# Patient Record
Sex: MALE | Race: WHITE | Employment: OTHER | URBAN - METROPOLITAN AREA
[De-identification: names, ages, dates, MRNs, and addresses within clinical notes are randomized per-mention and may not be internally consistent; named-entity substitution may affect disease eponyms.]

---

## 2020-08-17 DIAGNOSIS — F41.9 ANXIETY: Primary | ICD-10-CM

## 2020-08-17 RX ORDER — ALPRAZOLAM 1 MG/1
TABLET ORAL
Qty: 180 TAB | Refills: 0 | Status: SHIPPED | OUTPATIENT
Start: 2020-08-17 | End: 2020-08-19 | Stop reason: SDUPTHER

## 2020-08-19 DIAGNOSIS — F41.9 ANXIETY: ICD-10-CM

## 2020-08-20 RX ORDER — ALPRAZOLAM 1 MG/1
1 TABLET ORAL 2 TIMES DAILY
Qty: 180 TAB | Refills: 0 | Status: SHIPPED | OUTPATIENT
Start: 2020-08-20 | End: 2020-11-15

## 2020-09-24 ENCOUNTER — VIRTUAL VISIT (OUTPATIENT)
Dept: INTERNAL MEDICINE CLINIC | Age: 50
End: 2020-09-24

## 2020-09-24 DIAGNOSIS — K64.9 HEMORRHOIDS, UNSPECIFIED HEMORRHOID TYPE: Primary | ICD-10-CM

## 2020-09-24 PROCEDURE — 99213 OFFICE O/P EST LOW 20 MIN: CPT | Performed by: INTERNAL MEDICINE

## 2020-09-24 RX ORDER — ALPRAZOLAM 1 MG/1
1 TABLET ORAL 2 TIMES DAILY
COMMUNITY
End: 2020-09-24 | Stop reason: SDUPTHER

## 2020-09-24 RX ORDER — HYDROCORTISONE 25 MG/G
CREAM TOPICAL
Qty: 30 G | Refills: 2 | Status: SHIPPED | OUTPATIENT
Start: 2020-09-24 | End: 2022-10-21

## 2020-09-24 RX ORDER — HYDROCORTISONE ACETATE SUPPOSITORY 30 MG/1
1 SUPPOSITORY RECTAL 2 TIMES DAILY
Qty: 24 SUPPOSITORY | Refills: 1 | Status: SHIPPED | OUTPATIENT
Start: 2020-09-24 | End: 2022-10-21

## 2020-09-24 NOTE — PROGRESS NOTES
Patricia Denney is a 52 y.o. male presenting for hemorrhoids          No chief complaint on file. DEBBIE Gonzalez is seen today virtually using HIPAA compliant video conferencing technology. The patient has previously provided full consent to use this technology and understands the risks and benefits of proceeding. I am seeing the patient today from my office in [place] and the patient is in his home in [address] [place] [address]. About a week ago clearly was doing some heavy lifting of some doors and he started noticing some pain in his anal region. He felt swollen area right around his anus that was quite tender and was somewhere between the size of a pea and a marble. He was able to spread his cheeks and using a mirror to see it and it looked pale. It was quite tender to the touch. He has not had any bleeding. It has stayed there despite his use of Neosporin ointment. He is not sure what to do about work or just exactly what to do about it. He has never had anything like this before. No past medical history on file. Current Outpatient Medications on File Prior to Visit   Medication Sig Dispense Refill    ALPRAZolam (XANAX) 1 mg tablet Take 1 Tab by mouth two (2) times a day. Max Daily Amount: 2 mg. 180 Tab 0    [DISCONTINUED] ALPRAZolam (XANAX) 1 mg tablet Take 1 mg by mouth two (2) times a day. No current facility-administered medications on file prior to visit. ROS as noted in the history of present illness  There were no vitals taken for this visit. Physical Exam Fiona Devine was not able to manipulate the and demonstrate the area in question    Assessment & Plan it sounds to me like he is got a hemorrhoid. I have instructed him twice a day to soak in warm water for 10 to 15 minutes. After that he is to insert a Anusol suppository and use Anusol cream externally. He is to do this for 10 days to 2 weeks and let us know.   If it is not getting any better he may need to see a surgeon. This visit took a total of 8 minutes.

## 2020-11-15 DIAGNOSIS — F41.9 ANXIETY: ICD-10-CM

## 2020-11-15 RX ORDER — ALPRAZOLAM 1 MG/1
TABLET ORAL
Qty: 180 TAB | Refills: 0 | Status: SHIPPED | OUTPATIENT
Start: 2020-11-15 | End: 2021-02-11 | Stop reason: SDUPTHER

## 2021-02-11 DIAGNOSIS — F41.9 ANXIETY: ICD-10-CM

## 2021-02-11 RX ORDER — ALPRAZOLAM 1 MG/1
TABLET ORAL
Qty: 180 TAB | Refills: 0 | Status: SHIPPED | OUTPATIENT
Start: 2021-02-11 | End: 2021-05-10

## 2021-05-10 DIAGNOSIS — F41.9 ANXIETY: ICD-10-CM

## 2021-05-10 RX ORDER — ALPRAZOLAM 1 MG/1
TABLET ORAL
Qty: 180 TAB | Refills: 0 | Status: SHIPPED | OUTPATIENT
Start: 2021-05-10 | End: 2021-08-06

## 2021-08-06 DIAGNOSIS — F41.9 ANXIETY: ICD-10-CM

## 2021-08-06 RX ORDER — ALPRAZOLAM 1 MG/1
TABLET ORAL
Qty: 180 TABLET | Refills: 0 | Status: SHIPPED | OUTPATIENT
Start: 2021-08-06 | End: 2021-11-08 | Stop reason: SDUPTHER

## 2021-11-08 DIAGNOSIS — F41.9 ANXIETY: ICD-10-CM

## 2021-11-09 RX ORDER — ALPRAZOLAM 1 MG/1
1 TABLET ORAL 2 TIMES DAILY
Qty: 180 TABLET | Refills: 0 | Status: SHIPPED | OUTPATIENT
Start: 2021-11-09 | End: 2021-11-12

## 2021-11-12 ENCOUNTER — VIRTUAL VISIT (OUTPATIENT)
Dept: INTERNAL MEDICINE CLINIC | Age: 51
End: 2021-11-12

## 2021-11-12 DIAGNOSIS — F41.9 ANXIETY: ICD-10-CM

## 2021-11-12 DIAGNOSIS — F41.9 ANXIETY: Primary | ICD-10-CM

## 2021-11-12 PROCEDURE — 99212 OFFICE O/P EST SF 10 MIN: CPT | Performed by: INTERNAL MEDICINE

## 2021-11-12 RX ORDER — ALPRAZOLAM 1 MG/1
TABLET ORAL
Qty: 180 TABLET | Refills: 0 | Status: SHIPPED | OUTPATIENT
Start: 2021-11-12 | End: 2022-02-08 | Stop reason: SDUPTHER

## 2021-11-12 NOTE — PROGRESS NOTES
Dima Ames is a 54 y.o. male presenting for chronic anxiety      I am seeing clinically today virtually using HIPAA compliant video conferencing technology. The patient has previously provided full consent to use this technology and understands the risks and benefits of proceeding. I am seeing the patient from my office in [place] today and the patient is in his home in [place]. Chief Complaint   Patient presents with    Medication Refill        HPI Billy Galan is seen virtually today in order to have his medications refilled. 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 He says that takes a toll on his body but it helps to pay the bills. Otherwise he is generally feeling well and staying active. No flareups of his hemorrhoids and he feels well. No past medical history on file. Current Outpatient Medications on File Prior to Visit   Medication Sig Dispense Refill    [DISCONTINUED] ALPRAZolam (XANAX) 1 mg tablet Take 1 Tablet by mouth two (2) times a day. 180 Tablet 0    hydrocortisone (ANUSOL-HC) 2.5 % rectal cream Apply externally to hemorrhoid (Patient not taking: Reported on 11/12/2021) 30 g 2    hydrocortisone acetate (Proctocort) 30 mg supp Insert 1 Suppository into rectum two (2) times a day. (Patient not taking: Reported on 11/12/2021) 24 Suppository 1     No current facility-administered medications on file prior to visit. ROS all systems reviewed and negative except as noted in the history of present illness  There were no vitals taken for this visit. Physical Exam because the virtual nature of this visit no actual exam is done    Assessment & Plan we will plan to continue Billy Galan on his present medications. He understands that I will be retiring in the end of December and he will need to see a new provider.   We will remain available should problems arise before January        Zeny Ford MD

## 2022-02-08 DIAGNOSIS — F41.9 ANXIETY: ICD-10-CM

## 2022-02-08 RX ORDER — ALPRAZOLAM 1 MG/1
1 TABLET ORAL 2 TIMES DAILY
Qty: 30 TABLET | Refills: 0 | Status: SHIPPED | OUTPATIENT
Start: 2022-02-08 | End: 2022-02-21 | Stop reason: SDUPTHER

## 2022-02-08 NOTE — TELEPHONE ENCOUNTER
Patient has 1 left, has an appt with you for 02/21    Requested Prescriptions     Pending Prescriptions Disp Refills    ALPRAZolam (XANAX) 1 mg tablet 180 Tablet 0     Sig: Take 1 Tablet by mouth two (2) times a day.

## 2022-02-21 ENCOUNTER — OFFICE VISIT (OUTPATIENT)
Dept: FAMILY MEDICINE CLINIC | Age: 52
End: 2022-02-21
Payer: COMMERCIAL

## 2022-02-21 VITALS — HEART RATE: 72 BPM | DIASTOLIC BLOOD PRESSURE: 83 MMHG | OXYGEN SATURATION: 97 % | SYSTOLIC BLOOD PRESSURE: 141 MMHG

## 2022-02-21 DIAGNOSIS — Z13.0 SCREENING FOR DEFICIENCY ANEMIA: ICD-10-CM

## 2022-02-21 DIAGNOSIS — Z13.1 DIABETES MELLITUS SCREENING: Primary | ICD-10-CM

## 2022-02-21 DIAGNOSIS — Z13.220 SCREENING CHOLESTEROL LEVEL: ICD-10-CM

## 2022-02-21 DIAGNOSIS — Z13.1 DIABETES MELLITUS SCREENING: ICD-10-CM

## 2022-02-21 DIAGNOSIS — Z11.59 NEED FOR HEPATITIS C SCREENING TEST: ICD-10-CM

## 2022-02-21 DIAGNOSIS — M51.36 LUMBAR DEGENERATIVE DISC DISEASE: ICD-10-CM

## 2022-02-21 DIAGNOSIS — H61.23 BILATERAL IMPACTED CERUMEN: ICD-10-CM

## 2022-02-21 DIAGNOSIS — F41.9 ANXIETY: ICD-10-CM

## 2022-02-21 PROBLEM — H93.13 TINNITUS OF BOTH EARS: Status: ACTIVE | Noted: 2022-02-21

## 2022-02-21 PROBLEM — H91.91 DECREASED HEARING OF RIGHT EAR: Status: ACTIVE | Noted: 2022-02-21

## 2022-02-21 PROCEDURE — 99214 OFFICE O/P EST MOD 30 MIN: CPT | Performed by: NURSE PRACTITIONER

## 2022-02-21 RX ORDER — MELOXICAM 7.5 MG/1
7.5 TABLET ORAL DAILY
Qty: 60 TABLET | Refills: 1 | Status: SHIPPED | OUTPATIENT
Start: 2022-02-21 | End: 2022-06-21 | Stop reason: SINTOL

## 2022-02-21 RX ORDER — ALPRAZOLAM 1 MG/1
1 TABLET ORAL 2 TIMES DAILY
Qty: 60 TABLET | Refills: 0 | Status: SHIPPED | OUTPATIENT
Start: 2022-02-21 | End: 2022-03-24

## 2022-02-21 NOTE — PROGRESS NOTES
History of Present Illness  Hair Corbett is a 54 y.o. male who presents today for:    Chief Complaint   Patient presents with   [facility] Establish Care     establish care with provider was a [facility]      Past Medical History  History reviewed. No pertinent past medical history. Surgical History  History reviewed. No pertinent surgical history. Current Medications  Current Outpatient Medications   Medication Sig    ALPRAZolam (XANAX) 1 mg tablet Take 1 Tablet by mouth two (2) times a day.  meloxicam (MOBIC) 7.5 mg tablet Take 1 Tablet by mouth daily.  hydrocortisone (ANUSOL-HC) 2.5 % rectal cream Apply externally to hemorrhoid (Patient not taking: Reported on 11/12/2021)    hydrocortisone acetate (Proctocort) 30 mg supp Insert 1 Suppository into rectum two (2) times a day. (Patient not taking: Reported on 11/12/2021)     No current facility-administered medications for this visit. Allergies/Drug Reactions  Allergies   Allergen Reactions    Penicillins Hives    Prednisone Other (comments)        Family History  History reviewed. No pertinent family history.      Social History  Social History     Tobacco Use    Smoking status: Former Smoker    Smokeless tobacco: Never Used   Vaping Use    Vaping Use: Never used   Substance Use Topics    Alcohol use: Not Currently    Drug use: Never        Health Maintenance   Topic Date Due    Hepatitis C Screening  Never done    COVID-19 Vaccine (1) Never done    DTaP/Tdap/Td series (1 - Tdap) Never done    Lipid Screen  Never done    Colorectal Cancer Screening Combo  Never done    Flu Vaccine (1) 06/30/2022 (Originally 9/1/2021)    Depression Screen  02/21/2023    Pneumococcal 0-64 years  Aged Out     Physical Exam  Vital signs:   Vitals:    02/21/22 1345 02/21/22 1410   BP: (!) 142/86 (!) 141/83   Pulse: 72    SpO2: 97%      General: alert, oriented, not in distress  Head: scalp normal, atraumatic  Eyes: pupils are equal and reactive, full and intact EOM's  Ears: patent ear canal, intact tympanic membrane  Nose: normal turbinates, no congestion or discharge  Lips/Mouth: moist lips and buccal mucosa, non-enlarged tonsils, pink throat  Neck: supple, no JVD, no lymphadenopathy, non-palpable thyroid  Chest/Lungs: clear breath sounds, no wheezing or crackles  Heart: normal rate, regular rhythm, no murmur  Abdomen: soft, non-distended, non-tender, normal bowel sounds, no organomegaly, no masses  Extremities: no focal deformities, no edema  Skin: no active skin lesions    Laboratory/Tests:  No visits with results within 3 Month(s) from this visit. Latest known visit with results is:   No results found for any previous visit. Patient reports history of right knee reconstruction approximately 2004. Patient reports history of dysguesia which resulted from trauma to nose approximately 1991. Patient reports history of decreased hearing in right ear, 80% hearing loss. He reports history of bilateral ear tinnitus. Patient reports history of bilateral ear cerumen impaction and is followed by ENT semi-annually for bilateral ear cerumen disimpaction. He reports his ears were disimpacted February 18, 2022. Will order Debrox at this visit. Patient reports history of lumbar degenerative disc disease. He reports he uses OTC Ibuprofen up to 800 mg as needed daily. 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 Will prescribe Meloxicam at this visit. Assessment/Plan:    1. Lumbar disc disease and lumbar back pain. Prescribed Meloxicam 7.5 mg tablet daily for management of lumbar disc disease and lumbar back pain. 2.  Anxiety. Continue Alprazolam 1 mg tablet twice daily for management of anxiety. 3.  Bilateral ear cerumen impaction.   Prescribed Carbamide Peroxide (Debrox) 6.5% otic solution instill 5 drops twice daily for management of bilateral ear tawnya impaction. I have discussed the diagnosis with the patient and the intended plan as seen in the above orders. The patient has received an after-visit summary and questions were answered concerning future plans. I have discussed medication side effects and warnings with the patient as well. I have reviewed the plan of care with the patient, accepted their input and they are in agreement with the treatment goals.        Kevin Aparicio NP  February 21, 2022

## 2022-02-21 NOTE — PROGRESS NOTES
History of Present Illness  Jacinda Claudio is a 54 y.o. male who presents today for:    Chief Complaint   Patient presents with   [facility]     establish care with provider was a [facility]        Past Medical History  History reviewed. No pertinent past medical history. Surgical History  History reviewed. No pertinent surgical history. Current Medications  Current Outpatient Medications   Medication Sig    ALPRAZolam (XANAX) 1 mg tablet Take 1 Tablet by mouth two (2) times a day.  hydrocortisone (ANUSOL-HC) 2.5 % rectal cream Apply externally to hemorrhoid (Patient not taking: Reported on 11/12/2021)    hydrocortisone acetate (Proctocort) 30 mg supp Insert 1 Suppository into rectum two (2) times a day. (Patient not taking: Reported on 11/12/2021)     No current facility-administered medications for this visit. Allergies/Drug Reactions  Allergies   Allergen Reactions    Penicillins Hives    Prednisone Other (comments)        Family History  History reviewed. No pertinent family history. Social History  Social History     Tobacco Use    Smoking status: Former Smoker    Smokeless tobacco: Never Used   Vaping Use    Vaping Use: Never used   Substance Use Topics    Alcohol use: Not Currently    Drug use: Never        Health Maintenance   Topic Date Due    Hepatitis C Screening  Never done    Depression Screen  Never done    COVID-19 Vaccine (1) Never done    DTaP/Tdap/Td series (1 - Tdap) Never done    Lipid Screen  Never done    Colorectal Cancer Screening Combo  Never done    Flu Vaccine (1) Never done    Pneumococcal 0-64 years  Aged Out       There is no immunization history on file for this patient. Review of Systems  ROS     Physical Exam  Vital signs: There were no vitals filed for this visit.     Physical Exam     General: alert, oriented, not in distress  Head: scalp normal, atraumatic  Eyes: pupils are equal and reactive, full and intact EOM's  Ears: patent ear canal, intact tympanic membrane  Nose: normal turbinates, no congestion or discharge  Lips/Mouth: moist lips and buccal mucosa, non-enlarged tonsils, pink throat  Neck: supple, no JVD, no lymphadenopathy, non-palpable thyroid  Chest/Lungs: clear breath sounds, no wheezing or crackles  Heart: normal rate, regular rhythm, no murmur  Abdomen: soft, non-distended, non-tender, normal bowel sounds, no organomegaly, no masses  Extremities: no focal deformities, no edema  Skin: no active skin lesions      Laboratory/Tests:  No visits with results within 3 Month(s) from this visit. Latest known visit with results is:   No results found for any previous visit. Assessment/Plan:              I have discussed the diagnosis with the patient and the intended plan as seen in the above orders. The patient has received an after-visit summary and questions were answered concerning future plans. I have discussed medication side effects and warnings with the patient as well. I have reviewed the plan of care with the patient, accepted their input and they are in agreement with the treatment goals.        Kevin Aparicio NP  February 21, 2022

## 2022-02-21 NOTE — PROGRESS NOTES
John Ghotra presents today for   Chief Complaint   Patient presents with   [facility] Establish Care     establish care with provider was a [facility]        Is someone accompanying this pt? No    Is the patient using any DME equipment during OV? No    Depression Screening:  3 most recent PHQ Screens 2/21/2022   Little interest or pleasure in doing things Not at all   Feeling down, depressed, irritable, or hopeless Not at all   Total Score PHQ 2 0       Learning Assessment:  No flowsheet data found. Fall Risk  No flowsheet data found. ADL  No flowsheet data found. Travel Screening:    Travel Screening     Question   Response    In the last month, have you been in contact with someone who was confirmed or suspected to have Coronavirus / COVID-19? No / Unsure    Have you had a COVID-19 viral test in the last 14 days? No    Do you have any of the following new or worsening symptoms? None of these    Have you traveled internationally or domestically in the last month? No      Travel History   Travel since 01/21/22    No documented travel since 01/21/22         Health Maintenance reviewed and discussed and ordered per Provider. Health Maintenance Due   Topic Date Due    Hepatitis C Screening  Never done    Depression Screen  Never done    COVID-19 Vaccine (1) Never done    DTaP/Tdap/Td series (1 - Tdap) Never done    Lipid Screen  Never done    Colorectal Cancer Screening Combo  Never done    Flu Vaccine (1) Never done   . Coordination of Care:  1. \"Have you been to the ER, urgent care clinic since your last visit? Hospitalized since your last visit? \" No    2. \"Have you seen or consulted any other health care providers outside of the [address] [place] [address] since your last visit? \" No     3. For patients aged 39-70: Has the patient had a colonoscopy? No     If the patient is female:    4. For patients aged 41-77: Has the patient had a mammogram within the past 2 years?  NA - based on age/sex    5. For patients aged 21-65: Has the patient had a pap smear?  NA - based on age/sex

## 2022-03-17 LAB
CREATININE, EXTERNAL: 0.9
LDL-C, EXTERNAL: 163

## 2022-03-18 PROBLEM — H91.91 DECREASED HEARING OF RIGHT EAR: Status: ACTIVE | Noted: 2022-02-21

## 2022-03-20 PROBLEM — H93.13 TINNITUS OF BOTH EARS: Status: ACTIVE | Noted: 2022-02-21

## 2022-03-24 DIAGNOSIS — F41.9 ANXIETY: ICD-10-CM

## 2022-03-24 RX ORDER — ALPRAZOLAM 1 MG/1
1 TABLET ORAL 2 TIMES DAILY
Qty: 60 TABLET | Refills: 0 | Status: SHIPPED | OUTPATIENT
Start: 2022-03-24 | End: 2022-03-25 | Stop reason: SDUPTHER

## 2022-03-25 DIAGNOSIS — F41.9 ANXIETY: ICD-10-CM

## 2022-03-25 RX ORDER — ALPRAZOLAM 1 MG/1
1 TABLET ORAL 2 TIMES DAILY
Qty: 60 TABLET | Refills: 0 | Status: SHIPPED | OUTPATIENT
Start: 2022-03-25 | End: 2022-04-25 | Stop reason: SDUPTHER

## 2022-04-25 DIAGNOSIS — F41.9 ANXIETY: ICD-10-CM

## 2022-04-25 RX ORDER — ALPRAZOLAM 1 MG/1
1 TABLET ORAL 2 TIMES DAILY
Qty: 60 TABLET | Refills: 0 | Status: SHIPPED | OUTPATIENT
Start: 2022-04-25 | End: 2022-05-24

## 2022-05-24 DIAGNOSIS — F41.9 ANXIETY: ICD-10-CM

## 2022-05-24 RX ORDER — ALPRAZOLAM 1 MG/1
1 TABLET ORAL 2 TIMES DAILY
Qty: 60 TABLET | Refills: 0 | Status: SHIPPED | OUTPATIENT
Start: 2022-05-24 | End: 2022-06-21 | Stop reason: SDUPTHER

## 2022-06-21 ENCOUNTER — VIRTUAL VISIT (OUTPATIENT)
Dept: FAMILY MEDICINE CLINIC | Age: 52
End: 2022-06-21
Payer: COMMERCIAL

## 2022-06-21 DIAGNOSIS — F41.9 ANXIETY: ICD-10-CM

## 2022-06-21 DIAGNOSIS — E78.2 MIXED HYPERLIPIDEMIA: Primary | ICD-10-CM

## 2022-06-21 PROCEDURE — 99442 PR PHYS/QHP TELEPHONE EVALUATION 11-20 MIN: CPT | Performed by: NURSE PRACTITIONER

## 2022-06-21 RX ORDER — ALPRAZOLAM 1 MG/1
1 TABLET ORAL 2 TIMES DAILY
Qty: 60 TABLET | Refills: 0 | Status: SHIPPED | OUTPATIENT
Start: 2022-06-21 | End: 2022-07-22

## 2022-06-21 NOTE — PROGRESS NOTES
David Kaba is a 54 y.o. male, evaluated via audio-only technology on 6/21/2022 for No chief complaint on file. .    Assessment & Plan:   1. Mixed hyperlipidemia. Advised patient to decrease high cholesterol and fatty foods to lower associated with mixed hyperlipidemia. 2.  Anxiety. Continue Alprazolam 1 mg tablet twice daily for management of anxiety. 12  Subjective: Total Cholesterol = 221 and LDL = 163 on 3/21/2022 and advised patient to decrease high cholesterol and fatty foods to lower risk associated with elevated cholesterol levels. Patient reports he has discontinued use of previously prescribed Meloxicam 7.5 mg tablet daily due to side effects which caused dizziness. Patient reports his prescribed Alprazolam manages his anxiety well. Prior to Admission medications    Medication Sig Start Date End Date Taking? Authorizing Provider   ALPRAZolam Laurel Crowder) 1 mg tablet Take 1 Tablet by mouth two (2) times a day. 6/21/22  Yes Anita Singh NP   ALPRAZolam Laurel Crowder) 1 mg tablet TAKE 1 TABLET BY MOUTH TWO (2) TIMES A DAY. 5/24/22 6/21/22  Brisa ORELLANA NP   carbamide peroxide (DEBROX) 6.5 % otic solution Administer 5 Drops into each ear two (2) times a day. 2/21/22   Brisa ORELLANA NP   meloxicam (MOBIC) 7.5 mg tablet Take 1 Tablet by mouth daily. 2/21/22 6/21/22  Brisa ORELLANA NP   hydrocortisone (ANUSOL-HC) 2.5 % rectal cream Apply externally to hemorrhoid  Patient not taking: Reported on 11/12/2021 9/24/20   Amber Victoria MD   hydrocortisone acetate (Proctocort) 30 mg supp Insert 1 Suppository into rectum two (2) times a day. Patient not taking: Reported on 11/12/2021 9/24/20   Amber Victoria MD       David Kaba was evaluated through a patient-initiated, synchronous (real-time) audio only encounter.  He (or guardian if applicable) is aware that it is a billable service, which includes applicable co-pays, with coverage as determined by his insurance carrier. This visit was conducted with the patient's (and/or Savi Welsh guardian's) verbal consent. He has not had a related appointment within my department in the past 7 days or scheduled within the next 24 hours. The patient was located in a state where the provider was licensed to provide care. The patient was located at: Home: [place] [address].  The provider was located at:  Facility (Appt Department): [facility]  [phone]    Total Time: minutes: 11-20 minutes    Ana Garay NP

## 2022-07-22 DIAGNOSIS — F41.9 ANXIETY: ICD-10-CM

## 2022-07-22 RX ORDER — ALPRAZOLAM 1 MG/1
1 TABLET ORAL 2 TIMES DAILY
Qty: 60 TABLET | Refills: 0 | Status: SHIPPED | OUTPATIENT
Start: 2022-07-22 | End: 2022-08-22

## 2022-08-22 DIAGNOSIS — F41.9 ANXIETY: ICD-10-CM

## 2022-08-22 RX ORDER — ALPRAZOLAM 1 MG/1
1 TABLET ORAL 2 TIMES DAILY
Qty: 60 TABLET | Refills: 0 | Status: SHIPPED | OUTPATIENT
Start: 2022-08-22 | End: 2022-09-19

## 2022-09-19 DIAGNOSIS — F41.9 ANXIETY: ICD-10-CM

## 2022-09-19 RX ORDER — ALPRAZOLAM 1 MG/1
1 TABLET ORAL 2 TIMES DAILY
Qty: 60 TABLET | Refills: 0 | Status: SHIPPED | OUTPATIENT
Start: 2022-09-19 | End: 2022-10-18

## 2022-10-18 DIAGNOSIS — F41.9 ANXIETY: ICD-10-CM

## 2022-10-18 RX ORDER — ALPRAZOLAM 1 MG/1
1 TABLET ORAL 2 TIMES DAILY
Qty: 60 TABLET | Refills: 0 | Status: SHIPPED | OUTPATIENT
Start: 2022-10-18

## 2022-10-21 ENCOUNTER — HOSPITAL ENCOUNTER (EMERGENCY)
Age: 52
Discharge: HOME | End: 2022-10-21
Attending: EMERGENCY MEDICINE
Payer: COMMERCIAL

## 2022-10-21 ENCOUNTER — TELEPHONE (OUTPATIENT)
Dept: FAMILY MEDICINE CLINIC | Age: 52
End: 2022-10-21

## 2022-10-21 VITALS
OXYGEN SATURATION: 97 % | HEART RATE: 63 BPM | RESPIRATION RATE: 18 BRPM | BODY MASS INDEX: 25.71 KG/M2 | DIASTOLIC BLOOD PRESSURE: 90 MMHG | WEIGHT: 194 LBS | SYSTOLIC BLOOD PRESSURE: 141 MMHG | TEMPERATURE: 98.3 F | HEIGHT: 73 IN

## 2022-10-21 DIAGNOSIS — Z77.011 LEAD EXPOSURE: Primary | ICD-10-CM

## 2022-10-21 DIAGNOSIS — R42 VERTIGO: Primary | ICD-10-CM

## 2022-10-21 DIAGNOSIS — Z77.011 LEAD EXPOSURE: ICD-10-CM

## 2022-10-21 PROCEDURE — 74011250636 HC RX REV CODE- 250/636: Performed by: EMERGENCY MEDICINE

## 2022-10-21 PROCEDURE — 99283 EMERGENCY DEPT VISIT LOW MDM: CPT

## 2022-10-21 RX ORDER — MECLIZINE HCL 12.5 MG 12.5 MG/1
12.5 TABLET ORAL
Qty: 15 TABLET | Refills: 0 | Status: SHIPPED | OUTPATIENT
Start: 2022-10-21 | End: 2022-10-31

## 2022-10-21 RX ORDER — MECLIZINE HCL 12.5 MG 12.5 MG/1
25 TABLET ORAL ONCE
Status: COMPLETED | OUTPATIENT
Start: 2022-10-21 | End: 2022-10-21

## 2022-10-21 RX ADMIN — MECLIZINE 25 MG: 12.5 TABLET ORAL at 13:00

## 2022-10-21 NOTE — TELEPHONE ENCOUNTER
Spoke with patient via phone call on 10/21/2022 at 3:05 PM.  Patient reports he was scraping paint from a house built in [place] [address] without respirator since 10/20/2022 and he experienced nausea and dizziness. Patient reported to ED on 10/21/2022 and was advised his symptoms were not indicative of vertigo and he was also told there was no ability to test blood lead levels. Patient is concerned of possible lead exposure from lead-based paint. Ordered blood lead level at this time.

## 2022-10-21 NOTE — ED PROVIDER NOTES
EMERGENCY DEPARTMENT HISTORY AND PHYSICAL EXAM      Date: 10/21/2022  Patient Name: Jairon Hawley    History of Presenting Illness     Chief Complaint   Patient presents with    Dizziness       History Provided By: Patient    HPI: Jairon Hawley, 50 y.o. male with a past medical history significant  anxiety  presents to the ED with cc of request for lead testing. Patient reports that over the last 2 weeks he has been working in an [employer]ript[employer] without a respirator on. A couple days ago he started to feel dizzy. He notices this when standing, walking. He denies any feeling of near syncope or lightheadedness like he is going to pass out and denies any chest pain, palpitations, shortness of breath, headache, any focal weakness. However he states he feels off balance when trying to walk and he has a room spinning sensation associated with nausea. He has not had any similar episodes like this before. It is better with resting but comes back when he stands up. Symptoms have been coming and going for the last couple of days. He thought about his recent exposure and thought that he may have acute lead poisoning so he presented here to get tested as he was unable to get through to his PCP. He denies any recent alcohol or drug use, fever, any medication use other than prescribed Xanax, has not any recent sinus symptoms or congestion. He states he has bilateral tinnitus that has been going on for most of his life and gets his ears cleaned out at ENT intermittently for cerumen impaction but not recently. There are no other complaints, changes, or physical findings at this time. PCP: Ama Parson NP    No current facility-administered medications on file prior to encounter. Current Outpatient Medications on File Prior to Encounter   Medication Sig Dispense Refill    ALPRAZolam (XANAX) 1 mg tablet TAKE 1 TABLET BY MOUTH TWO (2) TIMES A DAY.  60 Tablet 0    [DISCONTINUED] carbamide peroxide (DEBROX) 6.5 % otic solution Administer 5 Drops into each ear two (2) times a day. 30 mL 1    [DISCONTINUED] hydrocortisone (ANUSOL-HC) 2.5 % rectal cream Apply externally to hemorrhoid (Patient not taking: Reported on 11/12/2021) 30 g 2    [DISCONTINUED] hydrocortisone acetate (Proctocort) 30 mg supp Insert 1 Suppository into rectum two (2) times a day. (Patient not taking: Reported on 11/12/2021) 24 Suppository 1       Past History     Past Medical History:  History reviewed. No pertinent past medical history. Past Surgical History:  Past Surgical History:   Procedure Laterality Date    HX ORTHOPAEDIC         Family History:  History reviewed. No pertinent family history. Social History:  Social History     Tobacco Use    Smoking status: Former    Smokeless tobacco: Never   Vaping Use    Vaping Use: Never used   Substance Use Topics    Alcohol use: Not Currently    Drug use: Yes     Types: Marijuana       Allergies: Allergies   Allergen Reactions    Penicillins Hives    Prednisone Other (comments)         Review of Systems     Review of Systems   Constitutional:  Negative for fever. HENT:  Negative for rhinorrhea. Eyes:  Negative for visual disturbance. Respiratory:  Negative for shortness of breath. Cardiovascular:  Negative for chest pain and palpitations. Gastrointestinal:  Positive for nausea. Negative for abdominal pain and vomiting. Genitourinary:  Negative for decreased urine volume. Musculoskeletal:  Negative for back pain. Skin:  Negative for wound. Allergic/Immunologic: Negative for immunocompromised state. Neurological:  Positive for dizziness. Negative for syncope, facial asymmetry, speech difficulty, weakness and headaches. Hematological:  Does not bruise/bleed easily. Psychiatric/Behavioral:  Negative for confusion. Physical Exam     Physical Exam  Vitals and nursing note reviewed. Constitutional:       Appearance: Normal appearance.    HENT: Head: Normocephalic and atraumatic. Right Ear: Tympanic membrane normal.      Left Ear: Tympanic membrane normal.      Mouth/Throat:      Mouth: Mucous membranes are moist.   Eyes:      Conjunctiva/sclera: Conjunctivae normal.   Cardiovascular:      Rate and Rhythm: Normal rate and regular rhythm. Pulmonary:      Effort: Pulmonary effort is normal. No respiratory distress. Breath sounds: Normal breath sounds. Abdominal:      General: There is no distension. Palpations: Abdomen is soft. Tenderness: There is no abdominal tenderness. There is no guarding. Musculoskeletal:         General: No deformity. Cervical back: Neck supple. Skin:     General: Skin is warm and dry. Neurological:      Mental Status: He is alert and oriented to person, place, and time. Cranial Nerves: No cranial nerve deficit. Sensory: No sensory deficit. Motor: No weakness. Coordination: Coordination normal.      Gait: Gait normal.      Comments: No overt ataxia however patient states he feels subjectively unsteady on his feet and has difficulty with tandem gait without placing hand on nearby countertop. Normal finger-to-nose, elevation   Psychiatric:         Mood and Affect: Mood normal.         Behavior: Behavior normal.       Lab and Diagnostic Study Results     Labs -   No results found for this or any previous visit (from the past 12 hour(s)). Radiologic Studies -   @lastxrresult@  CT Results  (Last 48 hours)      None          CXR Results  (Last 48 hours)      None              Medical Decision Making   - I am the first provider for this patient. - I reviewed the vital signs, available nursing notes, past medical history, past surgical history, family history and social history. - Initial assessment performed. The patients presenting problems have been discussed, and they are in agreement with the care plan formulated and outlined with them.   I have encouraged them to ask questions as they arise throughout their visit. Vital Signs-Reviewed the patient's vital signs. Patient Vitals for the past 12 hrs:   Temp Pulse Resp BP SpO2   10/21/22 1239 -- -- -- (!) 170/80 --   10/21/22 1234 98.3 °F (36.8 °C) 68 18 (!) 161/97 100 %       Records Reviewed: Nursing Notes and Old Medical Records    The patient presents with dizziness with a differential diagnosis of  cardiac dysrhythm, dizziness/vertigo, generalized weakness, GI bleed/hypovolemia, hypertension, labrynthitis, syncope/loss of consciousness, TIA, vasovagal episode, and central vertigo bppv others      ED Course:          Provider Notes (Medical Decision Making): MDM     Patient presenting with intermittent dizziness for the last couple days after 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 He specifically reported no concern for carbon monoxide or other gas leak and there was no heat source on an old house, no exposure to chemicals. His presentation is that of acute onset of vertigo however. Many aspects point to likely peripheral source, he has no cerebellar abnormalities, no overt ataxia although has difficulty with tandem gait minimally. He has no nystagmus. Nursing called and spoke with the lab, we are unable to test for lead here, additionally I do not think that acute lead toxicity has anything to do with this presentation. Discussed this with the family. I recommended EKG, labs, possibly head CT or MRI, we discussed that we cannot rule out central stroke or vertigo.   Patient declined these test.  He was given meclizine here and had some improvement and would like to go home otherwise, he will return if worse, he states that he does not have any heart problems       Disposition   Disposition:       1:35 PM  I informed the pt that he needed further workup for adequate evaluation for his  dizziness   and that by refusing the above, he is at risk for arrythmia, further deterioration, and CVA  He is awake, alert, and he understands his condition and the risks involved in leaving. The patient has received meclizine and it has been 30 Minutes since last receiving this medication. He is clinically aware of his surroundings and able to ask appropriate questions, the patients spouse and the nurse present confirms he is not clinically intoxicated and able to make medical decisions. He verbalized knowing the risks and understood it was recommended that he stay and could also return at any time. The patient's spouse was present for the discussion and also verbalized that they understood both diagnosis, risks and could return at any time. They were both provided with warnings regarding worsening of his condition and were provided instructions to follow up with Bakari Kraus NP tomorrow or return to the Emergency Room as soon as possible. Twyla Chauhan MD        Discharged    DISCHARGE PLAN:  1. Current Discharge Medication List        START taking these medications    Details   meclizine (ANTIVERT) 12.5 mg tablet Take 1 Tablet by mouth three (3) times daily as needed for Dizziness for up to 10 days. Qty: 15 Tablet, Refills: 0           CONTINUE these medications which have NOT CHANGED    Details   ALPRAZolam (XANAX) 1 mg tablet TAKE 1 TABLET BY MOUTH TWO (2) TIMES A DAY. Qty: 60 Tablet, Refills: 0    Associated Diagnoses: Anxiety           2. Follow-up Information       Follow up With Specialties Details Why Contact Info    Bakari Kraus NP Nurse Practitioner   [facility] [id number] [phone]      [facility] EMERGENCY DEPT Emergency Medicine  If symptoms worsen or for ANY new concerns [place] [id number]  [phone]          3. Return to ED if worse   4.    Current Discharge Medication List        START taking these medications    Details   meclizine (ANTIVERT) 12.5 mg tablet Take 1 Tablet by mouth three (3) times daily as needed for Dizziness for up to 10 days. Qty: 15 Tablet, Refills: 0  Start date: 10/21/2022, End date: 10/31/2022               Diagnosis     Clinical Impression:   1. Vertigo        Attestations:    Delmer Sorto MD    Please note that this dictation was completed with [email or web address], the computer voice recognition software. Quite often unanticipated grammatical, syntax, homophones, and other interpretive errors are inadvertently transcribed by the computer software. Please disregard these errors. Please excuse any errors that have escaped final proofreading. Thank you.

## 2022-10-21 NOTE — ED TRIAGE NOTES
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

## 2022-10-22 ENCOUNTER — HOSPITAL ENCOUNTER (OUTPATIENT)
Dept: LAB | Age: 52
Discharge: HOME | End: 2022-10-22

## 2022-11-21 DIAGNOSIS — F41.9 ANXIETY: ICD-10-CM

## 2022-11-21 RX ORDER — ALPRAZOLAM 1 MG/1
1 TABLET ORAL 2 TIMES DAILY
Qty: 60 TABLET | Refills: 0 | Status: SHIPPED | OUTPATIENT
Start: 2022-11-21

## 2022-12-20 DIAGNOSIS — F41.9 ANXIETY: ICD-10-CM

## 2022-12-20 RX ORDER — ALPRAZOLAM 1 MG/1
1 TABLET ORAL 2 TIMES DAILY
Qty: 60 TABLET | Refills: 0 | Status: SHIPPED | OUTPATIENT
Start: 2022-12-20

## 2023-01-19 DIAGNOSIS — F41.9 ANXIETY: ICD-10-CM

## 2023-01-19 RX ORDER — ALPRAZOLAM 1 MG/1
1 TABLET ORAL 2 TIMES DAILY
Qty: 60 TABLET | Refills: 0 | Status: SHIPPED | OUTPATIENT
Start: 2023-01-19

## 2023-02-20 RX ORDER — ALPRAZOLAM 1 MG/1
TABLET ORAL
Qty: 180 TABLET | Refills: 0 | OUTPATIENT
Start: 2023-02-20

## 2023-02-20 RX ORDER — ALPRAZOLAM 1 MG/1
TABLET ORAL
Qty: 60 TABLET | Refills: 0 | OUTPATIENT
Start: 2023-02-20

## 2023-02-21 DIAGNOSIS — F41.9 ANXIETY DISORDER, UNSPECIFIED TYPE: Primary | ICD-10-CM

## 2023-02-22 RX ORDER — ALPRAZOLAM 1 MG/1
1 TABLET ORAL 2 TIMES DAILY
Qty: 60 TABLET | Refills: 0 | Status: SHIPPED | OUTPATIENT
Start: 2023-02-22 | End: 2023-03-24